# Patient Record
Sex: MALE | Race: OTHER | HISPANIC OR LATINO | URBAN - METROPOLITAN AREA
[De-identification: names, ages, dates, MRNs, and addresses within clinical notes are randomized per-mention and may not be internally consistent; named-entity substitution may affect disease eponyms.]

---

## 2020-03-28 ENCOUNTER — EMERGENCY (EMERGENCY)
Facility: HOSPITAL | Age: 60
LOS: 1 days | End: 2020-03-28
Attending: STUDENT IN AN ORGANIZED HEALTH CARE EDUCATION/TRAINING PROGRAM
Payer: SELF-PAY

## 2020-03-28 VITALS
OXYGEN SATURATION: 96 % | HEIGHT: 69 IN | DIASTOLIC BLOOD PRESSURE: 92 MMHG | RESPIRATION RATE: 20 BRPM | TEMPERATURE: 103 F | SYSTOLIC BLOOD PRESSURE: 146 MMHG | WEIGHT: 184.09 LBS | HEART RATE: 100 BPM

## 2020-03-28 PROCEDURE — 99283 EMERGENCY DEPT VISIT LOW MDM: CPT

## 2020-03-28 PROCEDURE — T1013: CPT

## 2020-03-28 PROCEDURE — 99284 EMERGENCY DEPT VISIT MOD MDM: CPT

## 2020-03-28 RX ORDER — AZITHROMYCIN 500 MG/1
1 TABLET, FILM COATED ORAL
Qty: 1 | Refills: 0
Start: 2020-03-28

## 2020-03-28 RX ORDER — ACETAMINOPHEN 500 MG
650 TABLET ORAL ONCE
Refills: 0 | Status: COMPLETED | OUTPATIENT
Start: 2020-03-28 | End: 2020-03-28

## 2020-03-28 RX ADMIN — Medication 650 MILLIGRAM(S): at 15:15

## 2020-03-28 NOTE — ED PROVIDER NOTE - OBJECTIVE STATEMENT
60 yr old M presented to ED with coughing and fever x 10 days. Pt also states he also has weakness  and loss of appetite. Pt explained that he feels chills after his landlord turn off his heater. Pt denies any SOB or difficulty breathing. Pt denies any significant past medical illness and does not take any daily medication. 60 yr old M presented to ED with coughing and fever x 10 days. Pt also states he also has weakness  and loss of appetite. Pt explained that he feels chills after his landlord turn off his heater. Pt denies any SOB or difficulty breathing. Pt denies any contact with any known COVID19 individual. Pt denies any significant past medical illness and does not take any daily medication.

## 2020-03-28 NOTE — ED PROVIDER NOTE - PROGRESS NOTE DETAILS
Pt was not tested for COVID19 and pt was also educated on coronavirus. Pt understands the importance of hand washing, Maintaining public distance and to self quarantine until for 2 weeks.

## 2020-03-28 NOTE — ED PROVIDER NOTE - PATIENT PORTAL LINK FT
You can access the FollowMyHealth Patient Portal offered by [facility] by registering at the following website: [email or web address]. By joining [employer]’s FollowMyHealth portal, you will also be able to view your health information using other applications (apps) compatible with our system.

## 2020-03-28 NOTE — ED PROVIDER NOTE - CLINICAL SUMMARY MEDICAL DECISION MAKING FREE TEXT BOX
60 yr old M presented to ED with coughing and fever x 10 days. Pt also states he also has weakness  and loss of appetite. Pt not toxic and not in any apparent distress. Lungs + slight rhonchi. No SOB and examination consistent with viral illness. Pt not tested for COVID19 but placed on self isolation/ quarantine for 2 weeks. Pt D/C with strict instruction to return to ED if SOB or difficulty breathing.

## 2020-03-28 NOTE — ED PROVIDER NOTE - ADDITIONAL NOTES AND INSTRUCTIONS:
Self Quarantine for 2 weeks.   Monitor vital s as discussed  Return to ED if SOB or difficulty breathing.

## 2020-03-28 NOTE — ED PROVIDER NOTE - ATTENDING CONTRIBUTION TO CARE
I performed a face to face history and physical exam of the patient and discussed their management with the resident/ACP. I reviewed the resident/ACP's note and agree with the documented findings and plan of care.    Pt with fever, cough x 10 d. no cp. no sob.    Pt well-appearing. no acute distress.      Pt reassured and instructed to isolate for 14 days. no COVID testing done. Instructed to return for chest pain, sob, or any other concerns.
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
## 2020-03-28 NOTE — ED ADULT TRIAGE NOTE - CHIEF COMPLAINT QUOTE
patient c/o short of breath, fever cough since last night, was tested here earlier in the week and tested positive, EMS found patient at 85% oxygen on room air and now is on 5LNC. pt c/o fever and cough x10 days

## 2020-03-28 NOTE — ED PROVIDER NOTE - CONSTITUTIONAL NOURISHMENT, MLM
Introduction Text (Please End With A Colon): The following procedure was deferred:
Detail Level: Detailed
well

## 2022-10-30 ENCOUNTER — EMERGENCY (EMERGENCY)
Facility: HOSPITAL | Age: 62
LOS: 1 days | Discharge: HOME | End: 2022-10-30
Attending: EMERGENCY MEDICINE
Payer: SELF-PAY

## 2022-10-30 VITALS
OXYGEN SATURATION: 95 % | HEIGHT: 69 IN | SYSTOLIC BLOOD PRESSURE: 170 MMHG | HEART RATE: 106 BPM | RESPIRATION RATE: 18 BRPM | TEMPERATURE: 99 F | DIASTOLIC BLOOD PRESSURE: 95 MMHG

## 2022-10-30 PROCEDURE — 99283 EMERGENCY DEPT VISIT LOW MDM: CPT

## 2022-10-30 PROCEDURE — 99282 EMERGENCY DEPT VISIT SF MDM: CPT

## 2022-10-30 NOTE — ED PROVIDER NOTE - PATIENT PORTAL LINK FT
You can access the FollowMyHealth Patient Portal offered by [facility] by registering at the following website: [email or web address]. By joining [employer]’s FollowMyHealth portal, you will also be able to view your health information using other applications (apps) compatible with our system.
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
## 2022-10-30 NOTE — ED PROVIDER NOTE - NS ED ATTENDING STATEMENT MOD
This was a shared visit with the SUSI. I reviewed and verified the documentation and independently performed the documented:

## 2025-04-09 ENCOUNTER — EMERGENCY (EMERGENCY)
Facility: HOSPITAL | Age: 65
LOS: 1 days | End: 2025-04-09
Attending: EMERGENCY MEDICINE
Payer: SELF-PAY

## 2025-04-09 VITALS
RESPIRATION RATE: 20 BRPM | HEART RATE: 96 BPM | SYSTOLIC BLOOD PRESSURE: 115 MMHG | OXYGEN SATURATION: 98 % | WEIGHT: 163.14 LBS | TEMPERATURE: 98 F | DIASTOLIC BLOOD PRESSURE: 68 MMHG

## 2025-04-09 LAB
A1C WITH ESTIMATED AVERAGE GLUCOSE RESULT: >16.9 % — HIGH (ref 4–5.6)
ACETONE SERPL-MCNC: NEGATIVE — SIGNIFICANT CHANGE UP
ALBUMIN SERPL ELPH-MCNC: 4 G/DL — SIGNIFICANT CHANGE UP (ref 3.3–5.2)
ALP SERPL-CCNC: 180 U/L — HIGH (ref 40–120)
ALT FLD-CCNC: 66 U/L — HIGH
ANION GAP SERPL CALC-SCNC: 12 MMOL/L — SIGNIFICANT CHANGE UP (ref 5–17)
ANION GAP SERPL CALC-SCNC: 12 MMOL/L — SIGNIFICANT CHANGE UP (ref 5–17)
AST SERPL-CCNC: 74 U/L — HIGH
BASOPHILS # BLD AUTO: 0.01 K/UL — SIGNIFICANT CHANGE UP (ref 0–0.2)
BASOPHILS NFR BLD AUTO: 0.2 % — SIGNIFICANT CHANGE UP (ref 0–2)
BILIRUB SERPL-MCNC: 0.5 MG/DL — SIGNIFICANT CHANGE UP (ref 0.4–2)
BUN SERPL-MCNC: 7.2 MG/DL — LOW (ref 8–20)
BUN SERPL-MCNC: 7.8 MG/DL — LOW (ref 8–20)
CALCIUM SERPL-MCNC: 8.9 MG/DL — SIGNIFICANT CHANGE UP (ref 8.4–10.5)
CALCIUM SERPL-MCNC: 9.4 MG/DL — SIGNIFICANT CHANGE UP (ref 8.4–10.5)
CHLORIDE SERPL-SCNC: 95 MMOL/L — LOW (ref 96–108)
CHLORIDE SERPL-SCNC: 98 MMOL/L — SIGNIFICANT CHANGE UP (ref 96–108)
CO2 SERPL-SCNC: 23 MMOL/L — SIGNIFICANT CHANGE UP (ref 22–29)
CO2 SERPL-SCNC: 24 MMOL/L — SIGNIFICANT CHANGE UP (ref 22–29)
CREAT SERPL-MCNC: 0.66 MG/DL — SIGNIFICANT CHANGE UP (ref 0.5–1.3)
CREAT SERPL-MCNC: 0.8 MG/DL — SIGNIFICANT CHANGE UP (ref 0.5–1.3)
EGFR: 105 ML/MIN/1.73M2 — SIGNIFICANT CHANGE UP
EGFR: 105 ML/MIN/1.73M2 — SIGNIFICANT CHANGE UP
EGFR: 99 ML/MIN/1.73M2 — SIGNIFICANT CHANGE UP
EGFR: 99 ML/MIN/1.73M2 — SIGNIFICANT CHANGE UP
EOSINOPHIL # BLD AUTO: 0.03 K/UL — SIGNIFICANT CHANGE UP (ref 0–0.5)
EOSINOPHIL NFR BLD AUTO: 0.7 % — SIGNIFICANT CHANGE UP (ref 0–6)
ESTIMATED AVERAGE GLUCOSE: >438 MG/DL — HIGH (ref 68–114)
GAS PNL BLDV: SIGNIFICANT CHANGE UP
GLUCOSE BLDC GLUCOMTR-MCNC: 213 MG/DL — HIGH (ref 70–99)
GLUCOSE SERPL-MCNC: 393 MG/DL — HIGH (ref 70–99)
GLUCOSE SERPL-MCNC: 508 MG/DL — CRITICAL HIGH (ref 70–99)
HCT VFR BLD CALC: 41.9 % — SIGNIFICANT CHANGE UP (ref 39–50)
HGB BLD-MCNC: 14.5 G/DL — SIGNIFICANT CHANGE UP (ref 13–17)
IMM GRANULOCYTES # BLD AUTO: 0 K/UL — SIGNIFICANT CHANGE UP (ref 0–0.07)
IMM GRANULOCYTES NFR BLD AUTO: 0 % — SIGNIFICANT CHANGE UP (ref 0–0.9)
LYMPHOCYTES # BLD AUTO: 1.64 K/UL — SIGNIFICANT CHANGE UP (ref 1–3.3)
LYMPHOCYTES NFR BLD AUTO: 38.6 % — SIGNIFICANT CHANGE UP (ref 13–44)
MCHC RBC-ENTMCNC: 30.1 PG — SIGNIFICANT CHANGE UP (ref 27–34)
MCHC RBC-ENTMCNC: 34.6 G/DL — SIGNIFICANT CHANGE UP (ref 32–36)
MCV RBC AUTO: 86.9 FL — SIGNIFICANT CHANGE UP (ref 80–100)
MONOCYTES # BLD AUTO: 0.35 K/UL — SIGNIFICANT CHANGE UP (ref 0–0.9)
MONOCYTES NFR BLD AUTO: 8.2 % — SIGNIFICANT CHANGE UP (ref 2–14)
NEUTROPHILS # BLD AUTO: 2.22 K/UL — SIGNIFICANT CHANGE UP (ref 1.8–7.4)
NEUTROPHILS NFR BLD AUTO: 52.3 % — SIGNIFICANT CHANGE UP (ref 43–77)
NRBC # BLD AUTO: 0 K/UL — SIGNIFICANT CHANGE UP (ref 0–0)
NRBC # FLD: 0 K/UL — SIGNIFICANT CHANGE UP (ref 0–0)
NRBC BLD AUTO-RTO: 0 /100 WBCS — SIGNIFICANT CHANGE UP (ref 0–0)
OSMOLALITY SERPL: 310 MOSMOL/KG — HIGH (ref 280–301)
PLATELET # BLD AUTO: 168 K/UL — SIGNIFICANT CHANGE UP (ref 150–400)
PMV BLD: 11.6 FL — SIGNIFICANT CHANGE UP (ref 7–13)
POTASSIUM SERPL-MCNC: 4.4 MMOL/L — SIGNIFICANT CHANGE UP (ref 3.5–5.3)
POTASSIUM SERPL-MCNC: 5.8 MMOL/L — HIGH (ref 3.5–5.3)
POTASSIUM SERPL-SCNC: 4.4 MMOL/L — SIGNIFICANT CHANGE UP (ref 3.5–5.3)
POTASSIUM SERPL-SCNC: 5.8 MMOL/L — HIGH (ref 3.5–5.3)
PROT SERPL-MCNC: 7.4 G/DL — SIGNIFICANT CHANGE UP (ref 6.6–8.7)
RBC # BLD: 4.82 M/UL — SIGNIFICANT CHANGE UP (ref 4.2–5.8)
RBC # FLD: 12.2 % — SIGNIFICANT CHANGE UP (ref 10.3–14.5)
SODIUM SERPL-SCNC: 131 MMOL/L — LOW (ref 135–145)
SODIUM SERPL-SCNC: 133 MMOL/L — LOW (ref 135–145)
WBC # BLD: 4.25 K/UL — SIGNIFICANT CHANGE UP (ref 3.8–10.5)
WBC # FLD AUTO: 4.25 K/UL — SIGNIFICANT CHANGE UP (ref 3.8–10.5)

## 2025-04-09 PROCEDURE — 72125 CT NECK SPINE W/O DYE: CPT | Mod: 26

## 2025-04-09 PROCEDURE — 99223 1ST HOSP IP/OBS HIGH 75: CPT

## 2025-04-09 PROCEDURE — 70450 CT HEAD/BRAIN W/O DYE: CPT | Mod: 26

## 2025-04-09 RX ORDER — GLUCAGON 3 MG/1
1 POWDER NASAL ONCE
Refills: 0 | Status: DISCONTINUED | OUTPATIENT
Start: 2025-04-09 | End: 2025-04-16

## 2025-04-09 RX ORDER — DEXTROSE 50 % IN WATER 50 %
15 SYRINGE (ML) INTRAVENOUS ONCE
Refills: 0 | Status: DISCONTINUED | OUTPATIENT
Start: 2025-04-09 | End: 2025-04-16

## 2025-04-09 RX ORDER — DEXTROSE 50 % IN WATER 50 %
12.5 SYRINGE (ML) INTRAVENOUS ONCE
Refills: 0 | Status: DISCONTINUED | OUTPATIENT
Start: 2025-04-09 | End: 2025-04-16

## 2025-04-09 RX ORDER — SODIUM CHLORIDE 9 G/1000ML
1000 INJECTION, SOLUTION INTRAVENOUS
Refills: 0 | Status: DISCONTINUED | OUTPATIENT
Start: 2025-04-09 | End: 2025-04-16

## 2025-04-09 RX ORDER — INSULIN LISPRO 100 U/ML
INJECTION, SOLUTION INTRAVENOUS; SUBCUTANEOUS
Refills: 0 | Status: DISCONTINUED | OUTPATIENT
Start: 2025-04-09 | End: 2025-04-16

## 2025-04-09 RX ORDER — DEXTROSE 50 % IN WATER 50 %
25 SYRINGE (ML) INTRAVENOUS ONCE
Refills: 0 | Status: DISCONTINUED | OUTPATIENT
Start: 2025-04-09 | End: 2025-04-16

## 2025-04-09 RX ORDER — INSULIN GLARGINE-YFGN 100 [IU]/ML
14 INJECTION, SOLUTION SUBCUTANEOUS AT BEDTIME
Refills: 0 | Status: DISCONTINUED | OUTPATIENT
Start: 2025-04-09 | End: 2025-04-16

## 2025-04-09 RX ADMIN — Medication 1000 MILLILITER(S): at 13:07

## 2025-04-09 RX ADMIN — Medication 1000 MILLILITER(S): at 14:39

## 2025-04-09 RX ADMIN — INSULIN GLARGINE-YFGN 14 UNIT(S): 100 INJECTION, SOLUTION SUBCUTANEOUS at 23:17

## 2025-04-09 NOTE — ED CDU PROVIDER INITIAL DAY NOTE - PHYSICAL EXAMINATION
Gen: Well appearing in NAD  Head: NC/AT  Neck: trachea midline  Resp:  No distress  Ext: no deformities  Neuro:  A&O FROM of bialteral upper extremities, gross sensation intact bilaterally. 5/5 stregth bilateral upper extremities  Skin:  Warm and dry as visualized  Psych:  Normal affect and mood

## 2025-04-09 NOTE — ED PROVIDER NOTE - PHYSICAL EXAMINATION
Constitutional - well-developed.   Head - NCAT. Airway patent.   Eyes - PERRL.   CV - RRR. no murmur. no edema.   Pulm - CTAB.   Abd - soft, nt. no rebound. no guarding.   Neuro - A&Ox3. strength 5/5 x4. sensation intact x4. normal gait. CN II-xII intac.t  Skin - No rash. .  MSK - normal ROM.

## 2025-04-09 NOTE — ED PROVIDER NOTE - CLINICAL SUMMARY MEDICAL DECISION MAKING FREE TEXT BOX
labs reviewed. Pt with new onset of DM. Pt not in DKA.  glucose improved with IVF.  Pt also with cervical radiculopathy but strenght and sensation intact currently.  case d/w dr. mooney and SIDDHARTH Hannon and will put in obs for evaluation.

## 2025-04-09 NOTE — ED CDU PROVIDER INITIAL DAY NOTE - OBJECTIVE STATEMENT
Old male no significant past medical history presented to the emergency department for intermittent left sided arm tingling as well as reported stiffness, no traumatic injury no history of pre-existing neck or back issues.  Had initially been seen at outpatient clinic and referred to the ED.  Denied any chest pain shortness of breath, no underlying cardiac history as per patient although does not seek medical evaluation unless there is an issue.  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  Patient found to have elevated blood glucose on arrival to the emergency department and a hemoglobin A1c greater than 16.9, pH 7.3, and anio gap of 12.  Patient received 2 L of normal saline with downtrending blood glucose now below 300.  CT completed showing degenerative changes with severe bilateral neuroforaminal stenosis.  No focal deficits on examination strength and sensation intact to bilateral upper extremities..  Patient without known family history of diabetes.  Placed in observation for initiation of of hyperglycemia protocol including insulin therapy as well as education on blood sugar monitoring and insulin injections.  Case management contacted to determine [facility].  Patient advised on the need of further outpatient follow-up with endocrinologist/diabetes group.  Patient also advised of the need for further outpatient follow-up with spine referral that will be given upon discharge.

## 2025-04-09 NOTE — ED CDU PROVIDER INITIAL DAY NOTE - NSCAREINITIATED _GEN_ER
----- Message from Chelsea Christine sent at 11/19/2019 12:30 PM CST -----  Contact: Patient [phone]  Patient stated that she dropped off labs last week and has not heard back from you.    Please call and advise.    Thank You    
Subhash Morris(Attending)

## 2025-04-09 NOTE — ED CDU PROVIDER INITIAL DAY NOTE - CLINICAL SUMMARY MEDICAL DECISION MAKING FREE TEXT BOX
Old male no significant past medical history presented to the emergency department for intermittent left sided arm tingling as well as reported stiffness, no traumatic injury no history of pre-existing neck or back issues.  Had initially been seen at outpatient clinic and referred to the ED.  Denied any chest pain shortness of breath, no underlying cardiac history as per patient although does not seek medical evaluation unless there is an issue.  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  Patient found to have elevated blood glucose on arrival to the emergency department and a hemoglobin A1c greater than 16.9, pH 7.3, and ion gap of 12.  Patient received 2 L of normal saline with downtrending blood glucose now below 300.  CT completed showing degenerative changes with severe bilateral neuroforaminal stenosis.  No focal deficits on examination strength and sensation intact to bilateral upper extremities..  Patient without known family history of diabetes.  Placed in observation for initiation of of hyperglycemia protocol including insulin therapy as well as education on blood sugar monitoring and insulin injections.  Case management contacted to determine if patient qualifies for [facility].  Patient advised on the need of further outpatient follow-up with endocrinologist/diabetes group.  Patient also advised of the need for further outpatient follow-up with spine referral that will be given upon discharge.

## 2025-04-09 NOTE — ED ADULT TRIAGE NOTE - CHIEF COMPLAINT QUOTE
Pt c/o numbness to left arm x 4 days.  Pt was seen by PMD yesterday and told he should come to hospital.  Pt denies blurred vision, weakness.  Equal strength, sensation bilaterally.  No facial droop, extremity weakness noted.  Denies cp, hx of DM. Pt c/o numbness to left arm x 4 days.  Pt was seen by PMD yesterday and told he should come to hospital.  Pt denies blurred vision, weakness.  Equal strength, sensation bilaterally.  No facial droop, extremity weakness noted.  Denies cp, hx of DM.  Pt BGL = HI in triage.

## 2025-04-09 NOTE — ED PROVIDER NOTE - OBJECTIVE STATEMENT
JUAN ANTONIO aldrich a 64 yo M co L arm numbness. Pt states that for the past foiur days he has had intermittent L arm parasthesias with intermittent L arm stiffness. Pt states that he went to the clinic today and was told if it persisted to come to the ER.  Pt states that it does radiate up to his neck as well. no pain. no n/v.

## 2025-04-09 NOTE — CHART NOTE - NSCHARTNOTEFT_GEN_A_CORE
CM assisted pt in filling out Certification of Eligibility for [facility] via language line for new diabetic medications. Form emailed to [facility] pharmacy @ [facility], confirmation received. MD to send meds to [facility] @ [facility].

## 2025-04-09 NOTE — ED PROVIDER NOTE - CARE PLAN
Principal Discharge DX:	Diabetes mellitus, new onset  Secondary Diagnosis:	Cervical radiculopathy   1

## 2025-04-09 NOTE — ED CDU PROVIDER INITIAL DAY NOTE - PROGRESS NOTE DETAILS
dysmetria appreciated by OBS MD, MR and cervical spine ordered and pending Patient signed out from Day [facility]. Presented to the ED for left arm numbness x 4 days, clinic recommended ED eval. ED record reviewed > BGl elevated 600 > 508 >393 > 293. A1c over 16.9. CT cervical and head with no acute changes, stenosis of C6-C7. Pending MR head and neck and further DM education and control

## 2025-04-09 NOTE — ED ADULT NURSE NOTE - CHIEF COMPLAINT QUOTE
Pt c/o numbness to left arm x 4 days.  Pt was seen by PMD yesterday and told he should come to hospital.  Pt denies blurred vision, weakness.  Equal strength, sensation bilaterally.  No facial droop, extremity weakness noted.  Denies cp, hx of DM.  Pt BGL = HI in triage.

## 2025-04-09 NOTE — ED ADULT NURSE NOTE - OBJECTIVE STATEMENT
Patient complaining of numbness to left arm for 4days, saw PCP yesterday who told him he should come to the hospital. Denies blurred vision/weakness. No chest pain. History of DM. Equal strength and sensation to bilateral upper extremities. Patient AOx4, no complaints of pain or shortness of breath. Full movement of all four extremities. Complains of numbness of left arm from shoulder area down to fingers. Positive peripheral pulses, no edema noted. No issues with urination or having BMs. IV placed, labs sent, 1L normal saline infusing. Pending CT head and cervical spine

## 2025-04-09 NOTE — ED CDU PROVIDER INITIAL DAY NOTE - ATTENDING APP SHARED VISIT CONTRIBUTION OF CARE
65-year-old male with no PMH  presents for left upper extremity  paresthesias but also stating that it for the past 4 days he has not been able to reach objects on the first attempt.  Patient denies any falls trauma neck pain or back issues.  Patient initially seen in the emergency department concern for radiculopathy therefore CT cervical spine and head were done on evaluation patient found to have dysmetria of the left upper extremity which he states has been his issue for the past 4 days.  Therefore in addition to controlling his glucose and diabetic education will do MRI to evaluate for CVA and radiculopathy

## 2025-04-10 VITALS
DIASTOLIC BLOOD PRESSURE: 86 MMHG | TEMPERATURE: 98 F | OXYGEN SATURATION: 97 % | HEART RATE: 71 BPM | RESPIRATION RATE: 18 BRPM | SYSTOLIC BLOOD PRESSURE: 152 MMHG

## 2025-04-10 LAB
GLUCOSE BLDC GLUCOMTR-MCNC: 232 MG/DL — HIGH (ref 70–99)
GLUCOSE BLDC GLUCOMTR-MCNC: 266 MG/DL — HIGH (ref 70–99)

## 2025-04-10 PROCEDURE — 82330 ASSAY OF CALCIUM: CPT

## 2025-04-10 PROCEDURE — 72141 MRI NECK SPINE W/O DYE: CPT | Mod: MC

## 2025-04-10 PROCEDURE — 82962 GLUCOSE BLOOD TEST: CPT

## 2025-04-10 PROCEDURE — 82803 BLOOD GASES ANY COMBINATION: CPT

## 2025-04-10 PROCEDURE — 83930 ASSAY OF BLOOD OSMOLALITY: CPT

## 2025-04-10 PROCEDURE — 85018 HEMOGLOBIN: CPT

## 2025-04-10 PROCEDURE — 84295 ASSAY OF SERUM SODIUM: CPT

## 2025-04-10 PROCEDURE — 84132 ASSAY OF SERUM POTASSIUM: CPT

## 2025-04-10 PROCEDURE — 82009 KETONE BODYS QUAL: CPT

## 2025-04-10 PROCEDURE — 82947 ASSAY GLUCOSE BLOOD QUANT: CPT

## 2025-04-10 PROCEDURE — 80053 COMPREHEN METABOLIC PANEL: CPT

## 2025-04-10 PROCEDURE — 99239 HOSP IP/OBS DSCHRG MGMT >30: CPT

## 2025-04-10 PROCEDURE — T1013: CPT

## 2025-04-10 PROCEDURE — 36415 COLL VENOUS BLD VENIPUNCTURE: CPT

## 2025-04-10 PROCEDURE — 83036 HEMOGLOBIN GLYCOSYLATED A1C: CPT

## 2025-04-10 PROCEDURE — 96372 THER/PROPH/DIAG INJ SC/IM: CPT

## 2025-04-10 PROCEDURE — 70551 MRI BRAIN STEM W/O DYE: CPT | Mod: MC

## 2025-04-10 PROCEDURE — 82435 ASSAY OF BLOOD CHLORIDE: CPT

## 2025-04-10 PROCEDURE — 70450 CT HEAD/BRAIN W/O DYE: CPT | Mod: MC

## 2025-04-10 PROCEDURE — 85025 COMPLETE CBC W/AUTO DIFF WBC: CPT

## 2025-04-10 PROCEDURE — 80048 BASIC METABOLIC PNL TOTAL CA: CPT

## 2025-04-10 PROCEDURE — 99284 EMERGENCY DEPT VISIT MOD MDM: CPT | Mod: 25

## 2025-04-10 PROCEDURE — 72141 MRI NECK SPINE W/O DYE: CPT | Mod: 26

## 2025-04-10 PROCEDURE — 83605 ASSAY OF LACTIC ACID: CPT

## 2025-04-10 PROCEDURE — 70551 MRI BRAIN STEM W/O DYE: CPT | Mod: 26

## 2025-04-10 PROCEDURE — G0378: CPT

## 2025-04-10 PROCEDURE — 85014 HEMATOCRIT: CPT

## 2025-04-10 PROCEDURE — 72125 CT NECK SPINE W/O DYE: CPT | Mod: MC

## 2025-04-10 RX ORDER — INSULIN GLARGINE-YFGN 100 [IU]/ML
14 INJECTION, SOLUTION SUBCUTANEOUS
Qty: 2 | Refills: 0
Start: 2025-04-10 | End: 2025-05-09

## 2025-04-10 RX ORDER — ISOPROPYL ALCOHOL, BENZOCAINE .7; .06 ML/ML; ML/ML
0 SWAB TOPICAL
Qty: 100 | Refills: 1
Start: 2025-04-10

## 2025-04-10 RX ORDER — METFORMIN HYDROCHLORIDE 850 MG/1
1 TABLET ORAL
Qty: 60 | Refills: 0
Start: 2025-04-10 | End: 2025-05-09

## 2025-04-10 RX ADMIN — INSULIN LISPRO 4: 100 INJECTION, SOLUTION INTRAVENOUS; SUBCUTANEOUS at 09:00

## 2025-04-10 RX ADMIN — INSULIN LISPRO 6: 100 INJECTION, SOLUTION INTRAVENOUS; SUBCUTANEOUS at 12:34

## 2025-04-10 NOTE — ED CDU PROVIDER DISPOSITION NOTE - CARE PROVIDERS DIRECT ADDRESSES
,DirectAddress_Unknown,[email or web address]

## 2025-04-10 NOTE — ED CDU PROVIDER DISPOSITION NOTE - NSFOLLOWUPINSTRUCTIONS_ED_ALL_ED_FT
please follow with   - [facility] next week   - SPINE referral 2 weeks for degenerative changes to the neck    nightly insulin injections 14 units   daily metformin 500mg tablets twice a day  please continue to monitor blood sugars morning and nightly   keep log of blood sugars     please continue to monitor for worsening tingling to the extremities, if new weakness numbness or inability to move arm seek immediate re-evaluation     Hyperglycemia    Hyperglycemia occurs when the glucose (sugar) level in your blood is too high. Symptoms include increased urination, increased thirst, a dry mouth, or changes in appetite. If started on a medication, take exactly as prescribed by your health care professional. Maintain a healthy lifestyle and follow up with your primary care physician.    SEEK IMMEDIATE MEDICAL CARE IF YOU HAVE ANY OF THE FOLLOWING SYMPTOMS: shortness of breath, change in mental status, nausea or vomiting, fruity smell to your breath, or any signs of dehydration.      Neuropathy    Peripheral neuropathy is a type of nerve damage that affects nerves that carry signals between the spinal cord and other parts of the body. Many things can damage peripheral nerves including vascular disease or diabetes. Signs and symptoms of neuropathy include numbness, tingling, pain, sensitive skin, weakness/paralysis of a body part, muscle twitching, loss of balance, not being able to control your bladder or sexual problems. If you have numbness in your feet, check every day for signs of infection including redness, warmth, and swelling.      SEEK IMMEDIATE MEDICAL CARE IF YOU HAVE ANY OF THE FOLLOWING SYMPTOMS: an injury or infection that is not healing, dizziness, vomiting, paralysis, chest pain, or trouble breathing.           Por favor, siga con...  - [facility] la próxima semana  - Derivación para columna vertebral elvira 2 semanas por cambios degenerativos en el sylvia    Inyecciones nocturnas de insulina de 14 unidades  Comprimidos diarios de metformina de 500 mg dos veces al día  Por favor, continúe monitoreando gardner nivel de azúcar en juan por la mañana y por la noche  Lleve un registro de gardner nivel de azúcar en juan    Por favor, continúe monitoreando si el hormigueo en las extremidades empeora. Si presenta debilidad, entumecimiento o incapacidad para  el brazo, busque shannon reevaluación inmediata.    Hiperglucemia    La hiperglucemia se produce cuando el nivel de glucosa (azúcar) en la juan es demasiado alto. Los síntomas incluyen aumento de la micción, aumento de la sed, sequedad bucal o cambios en el apetito. Si comienza a momo un medicamento, tómelo exactamente shruthi se lo recetó gardner profesional de la dave. Mantenga un estilo de sterling saludable y consulte con gardner médico de cabecera.    BUSQUE ATENCIÓN MÉDICA INMEDIATA SI TIENE ALGUNO DE LOS SIGUIENTES SÍNTOMAS: dificultad para respirar, cambios en el estado mental, náuseas o vómitos, olor afrutado en el aliento o cualquier signo de deshidratación.      Neuropatía    La neuropatía periférica es un tipo de daño nervioso que afecta los nervios que transmiten señales entre la médula desouza y otras partes del cuerpo. Muchos factores pueden dañar los nervios periféricos, shruthi las enfermedades vasculares o la diabetes. Los signos y síntomas de la neuropatía incluyen entumecimiento, hormigueo, dolor, piel sensible, debilidad o parálisis de alguna parte del cuerpo, espasmos musculares, pérdida del equilibrio, incapacidad para controlar la vejiga o problemas sexuales. Si tiene entumecimiento en los pies, revíselos diariamente para detectar signos de infección, shruthi enrojecimiento, calor e hinchazón.    BUSQUE ATENCIÓN MÉDICA INMEDIATA SI PRESENTA ALGUNO DE LOS SIGUIENTES SÍNTOMAS: shannon lesión o infección que no cicatriza, mareos, vómitos, parálisis, dolor en el pecho o dificultad para respirar.

## 2025-04-10 NOTE — ED CDU PROVIDER DISPOSITION NOTE - ATTENDING CONTRIBUTION TO CARE
I have personally seen and examined this patient. I have fully participated in the care of this patient. I have made amendments to the documentation where appropriate and otherwise agree with the history, physical exam, and plan as documented by the ACP.    Pt ruled out for stroke, initiated on insulin therapy for new onset diabetes and referred for outpatient management.

## 2025-04-10 NOTE — ED CDU PROVIDER DISPOSITION NOTE - CARE PROVIDER_API CALL
Andrey Don  Neurosurgery  [address] [place] [id number]  Phone: [phone]  Fax: [phone]  Follow Up Time: Urgent    [address]  Endocrinology/Metab/Diabetes  [address] [place] [address]  Phone: [phone]  Fax: [phone]  Follow Up Time: Urgent

## 2025-04-10 NOTE — ED CDU PROVIDER SUBSEQUENT DAY NOTE - CLINICAL SUMMARY MEDICAL DECISION MAKING FREE TEXT BOX
Presented to the ED for left arm numbness x 4 days, clinic recommended ED eval. ED record reviewed > BGl elevated 600 > 508 >393 > 293. A1c over 16.9. CT cervical and head with no acute changes, stenosis of C6-C7. Pending MR head and neck and further DM education and control

## 2025-04-10 NOTE — ED CDU PROVIDER SUBSEQUENT DAY NOTE - ATTENDING APP SHARED VISIT CONTRIBUTION OF CARE
I have personally seen and examined this patient. I have fully participated in the care of this patient. I have made amendments to the documentation where appropriate and otherwise agree with the history, physical exam, and plan as documented by the ACP.    MR negative for stroke, continuing glycemic control and insulin teaching

## 2025-04-10 NOTE — ED ADULT NURSE REASSESSMENT NOTE - NS ED NURSE REASSESS COMMENT FT1
Assumed care of pt from previous RN.  Dileep used at bedside. A&Ox4, RR even and unlabored on RA. Skin is warm and dry. Offering no complaints at this time. Pt educated on how to self inject insulin and demonstrated successfully back to RN. All questions regarding administration answered. PT feels comfortably with self administration.
Comments: pt resting comfortably, NAD, no complaints at this time, VSS.
Comments: pt resting comfortably, NAD, no complaints at this time, VSS.
Patient resting comfortably in bed, no complaints at this time. Pending CT results
Pt received from at 1900. Patient seen and assessed at bedside. Patient is A&Ox4, NAD. Pt denies pain/CP/SOB/N/V/D. PIV noted, intact, WNL. Plan of care reviewed-observe overnight for hyperglycemia, new onset DM teaching-ED  at bedside.  Call bell within reach. Safety maintained.
rcvd pt A&OX4, resp even and unlabored no distress noted pt appears comfortable in stretcher watching TV, bed in lowest position

## 2025-04-10 NOTE — ED CDU PROVIDER DISPOSITION NOTE - PROVIDER TOKENS
PROVIDER:[TOKEN:[[id number]],FOLLOWUP:[Urgent]],PROVIDER:[TOKEN:[[id number]],FOLLOWUP:[Urgent]]

## 2025-04-10 NOTE — ED CDU PROVIDER SUBSEQUENT DAY NOTE - PHYSICAL EXAMINATION
Gen: No acute distress, non toxic  HENT: NCAT, Mucous membranes moist  CV: RRR, nl s1/s2  Resp: CTAB, normal rate and effort  GI: Abdomen soft, NT, ND. No rebound, no guarding  Neuro: A&O x 3, CN II-XII intact, sensorimotor intact without deficits   MSK: No spine or joint tenderness to palpation, Full ROM ext x 4  Skin: No rashes. intact and perfused

## 2025-04-10 NOTE — ED CDU PROVIDER DISPOSITION NOTE - CLINICAL COURSE
65-year-old male no reported past medical history who presented to the emergency department for left upper extremity tingling sensation.  On initial ED evaluation found to have elevated blood glucose reading over 600, and hemoglobin's A1c greater than 16.9.  No acute signs of DKA pH 7.33 no anion gap.  CT head and cervical spine performed with notable degenerative disc changes and disc protrusions.  Gross sensation motor and strength intact but had demonstrated dysmetria with observation attending prompting further evaluation with MR of the head and cervical spine of which were completed and without acute infarct.  Discussed imaging result findings with patient and advised on the need of the further outpatient evaluation and treatment by spine referral.  While in observation patient was initiated on basal insulin and sliding scale with downtrending blood glucose readings.  Had demonstrated competency of use of glucometer as well as insulin injections with observation nurse.  Plan to discharge on insulin 14 units nightly as well as metformin 500 twice daily, with further outpatient following with endocrinology/[facility] diabetes services.

## 2025-04-10 NOTE — ED CDU PROVIDER DISPOSITION NOTE - NSFOLLOWUPCLINICS_GEN_ALL_ED_FT
[id number] [place] [address]  Phone: [phone]  Fax:   Follow Up Time: 1-3 Days

## 2025-04-10 NOTE — ED CDU PROVIDER DISPOSITION NOTE - PATIENT PORTAL LINK FT
You can access the FollowMyHealth Patient Portal offered by [facility] by registering at the following website: [email or web address]. By joining [facility]’s FollowMyHealth portal, you will also be able to view your health information using other applications (apps) compatible with our system.